# Patient Record
(demographics unavailable — no encounter records)

---

## 2019-01-02 NOTE — ER DOCUMENT REPORT
ED General





- General


Chief Complaint: Cold Symptoms


Stated Complaint: COLD SYMPTOMS


Time Seen by Provider: 01/02/19 06:11


Notes: 





Patient is a 22-year-old female that presents to the emergency department for 

chief complaint of sinus congestion and cold symptoms.  Patient states that her 

symptoms started a few days ago, she has had runny nose, congestion, and some 

sinus pressure, but denies having any pain.  She states she is been taking DayQ

uil, with some relief of her symptoms, she states that her boss at work advised 

her to come to the emergency department to be evaluated to see if she could go 

out [place], without issue.  She did have a mild cough 

associated as well.  Denies having any fevers, chills, night sweats, nausea, 

vomiting, abdominal pain, chest pain or difficulty breathing.





Past Medical History: Denies chronic medical conditions


Past Surgical History: Denies surgical history


Social History: Denies tobacco, alcohol or drug use.


Family History: Reviewed and noncontributory for presenting illness


Allergies: Reviewed, see documented allergy list. 





REVIEW OF SYSTEMS:


Other than noted above, the 12 point review of systems was reviewed with the 

patient and were negative, all pertinent findings are included in the HPI.





PHYSICAL EXAMINATION:





Vital signs reviewed, nursing noted reviewed. 





GENERAL: Well-appearing, well-nourished and in no acute distress.





HEAD: Atraumatic, normocephalic.





EYES: Eyes appear normal, sclera anicteric, conjunctiva are normal.





ENT:  Moist mucous membranes.  Mild bilateral nasal turbinate injection, 

posterior pharynx is unremarkable, TMs appear normal bilaterally.





NECK: Normal range of motion, supple without lymphadenopathy





LUNGS: Breath sounds clear to auscultation bilaterally and equal.  No wheezes 

rales or rhonchi.





HEART: Regular rate and rhythm without murmurs





EXTREMITIES: Nontender, good range of motion, no pitting or edema.  





NEUROLOGICAL: No focal neurological deficits. Moves all extremities 

spontaneously Motor and sensory grossly intact on exam.





PSYCH: Normal mood, normal affect.





SKIN: Warm, Dry, normal turgor, no rashes or lesions noted on exposed skin





TRAVEL OUTSIDE OF THE [place] IN LAST 30 DAYS: No





- Related Data


Allergies/Adverse Reactions: 


                                        





No Known Allergies Allergy (Unverified 01/02/19 05:40)


   











Past Medical History





- Social History


Smoking Status: Never Smoker


Family History: Reviewed & Not Pertinent


Patient has suicidal ideation: No


Patient has homicidal ideation: No


Renal/ Medical History: Denies: Hx Peritoneal Dialysis





Physical Exam





- Vital signs


Vitals: 


                                        











Temp Pulse Resp BP Pulse Ox


 


 97.7 F   93   18   119/70   96 


 


 01/02/19 05:42  01/02/19 05:42  01/02/19 05:42  01/02/19 05:42  01/02/19 05:42














Course





- Re-evaluation


Re-evalutation: 





Patient seen and examined vital signs reviewed. 





Patient was evaluated and treated as appropriate for the patient's presenting 

symptoms and complaint, with consideration of any critical or life threatening 

conditions that may be associated with their obtained history and exam as noted 

above.





Patient appeared well on exam, will discharge her home with a prescription for 

Flonase, advised over-the-counter Mucinex or Sudafed, as well and to follow-up 

with her primary care physician.





Plan of care was discussed with the patient at this point, after careful 

consideration I feel that that patient can be discharged from the emergency 

department, the patient was educated treatments and reasons to return to the 

emergency department based on their presumed diagnosis as noted above, they were

advised to followup with a primary care physician in 2-3 days. Patient was 

agreeable to plan of care.





*Note is created using voice recognition software and may contain spelling, 

syntax or grammatical errors.





- Vital Signs


Vital signs: 


                                        











Temp Pulse Resp BP Pulse Ox


 


 97.5 F   87   16   117/69   100 


 


 01/02/19 07:02  01/02/19 07:02  01/02/19 07:02  01/02/19 07:02  01/02/19 07:02














Discharge





- Discharge


Clinical Impression: 


URI (upper respiratory infection)


Qualifiers:


 URI type: unspecified URI Qualified Code(s): J06.9 - Acute upper respiratory 

infection, unspecified





Condition: Stable


Disposition: HOME, SELF-CARE


Instructions:  Upper Respiratory Illness ([facility])


Additional Instructions: 


Please take medications as prescribed, you should take over-the-counter Sudafed,

to help with some of your nasal congestion.


Prescriptions: 


Fluticasone Propionate [Flonase Nasal Spray 50 Mcg/Spray 16 gm] 1 spray NASL Q12

#1 inhaler


Forms:  Return to Work


Referrals: 


GERA BRIZUELA MD [ACTIVE STAFF] - Follow up in 3-5 days (or your primary 

care. )